# Patient Record
Sex: FEMALE | URBAN - METROPOLITAN AREA
[De-identification: names, ages, dates, MRNs, and addresses within clinical notes are randomized per-mention and may not be internally consistent; named-entity substitution may affect disease eponyms.]

---

## 2017-10-10 ENCOUNTER — OUTPATIENT (OUTPATIENT)
Dept: OUTPATIENT SERVICES | Facility: HOSPITAL | Age: 72
LOS: 1 days | End: 2017-10-10
Payer: MEDICARE

## 2017-10-10 VITALS
DIASTOLIC BLOOD PRESSURE: 80 MMHG | OXYGEN SATURATION: 96 % | SYSTOLIC BLOOD PRESSURE: 123 MMHG | TEMPERATURE: 98 F | RESPIRATION RATE: 18 BRPM | WEIGHT: 145.06 LBS | HEIGHT: 63 IN | HEART RATE: 93 BPM

## 2017-10-10 DIAGNOSIS — E10.9 TYPE 1 DIABETES MELLITUS WITHOUT COMPLICATIONS: ICD-10-CM

## 2017-10-10 DIAGNOSIS — Z01.818 ENCOUNTER FOR OTHER PREPROCEDURAL EXAMINATION: ICD-10-CM

## 2017-10-10 DIAGNOSIS — N20.0 CALCULUS OF KIDNEY: ICD-10-CM

## 2017-10-10 DIAGNOSIS — Z98.890 OTHER SPECIFIED POSTPROCEDURAL STATES: Chronic | ICD-10-CM

## 2017-10-10 DIAGNOSIS — Z98.89 OTHER SPECIFIED POSTPROCEDURAL STATES: Chronic | ICD-10-CM

## 2017-10-10 LAB
ANION GAP SERPL CALC-SCNC: 18 MMOL/L — HIGH (ref 5–17)
BUN SERPL-MCNC: 17 MG/DL — SIGNIFICANT CHANGE UP (ref 7–23)
CALCIUM SERPL-MCNC: 9.9 MG/DL — SIGNIFICANT CHANGE UP (ref 8.4–10.5)
CHLORIDE SERPL-SCNC: 99 MMOL/L — SIGNIFICANT CHANGE UP (ref 96–108)
CO2 SERPL-SCNC: 22 MMOL/L — SIGNIFICANT CHANGE UP (ref 22–31)
CREAT SERPL-MCNC: 0.76 MG/DL — SIGNIFICANT CHANGE UP (ref 0.5–1.3)
GLUCOSE SERPL-MCNC: 127 MG/DL — HIGH (ref 70–99)
HBA1C BLD-MCNC: 6.8 % — HIGH (ref 4–5.6)
HCT VFR BLD CALC: 40.7 % — SIGNIFICANT CHANGE UP (ref 34.5–45)
HGB BLD-MCNC: 13.3 G/DL — SIGNIFICANT CHANGE UP (ref 11.5–15.5)
MCHC RBC-ENTMCNC: 28.1 PG — SIGNIFICANT CHANGE UP (ref 27–34)
MCHC RBC-ENTMCNC: 32.7 GM/DL — SIGNIFICANT CHANGE UP (ref 32–36)
MCV RBC AUTO: 86 FL — SIGNIFICANT CHANGE UP (ref 80–100)
PLATELET # BLD AUTO: 322 K/UL — SIGNIFICANT CHANGE UP (ref 150–400)
POTASSIUM SERPL-MCNC: 4.1 MMOL/L — SIGNIFICANT CHANGE UP (ref 3.5–5.3)
POTASSIUM SERPL-SCNC: 4.1 MMOL/L — SIGNIFICANT CHANGE UP (ref 3.5–5.3)
RBC # BLD: 4.73 M/UL — SIGNIFICANT CHANGE UP (ref 3.8–5.2)
RBC # FLD: 14 % — SIGNIFICANT CHANGE UP (ref 10.3–14.5)
SODIUM SERPL-SCNC: 139 MMOL/L — SIGNIFICANT CHANGE UP (ref 135–145)
WBC # BLD: 5.78 K/UL — SIGNIFICANT CHANGE UP (ref 3.8–10.5)
WBC # FLD AUTO: 5.78 K/UL — SIGNIFICANT CHANGE UP (ref 3.8–10.5)

## 2017-10-10 NOTE — H&P PST ADULT - NSANTHOSAYNRD_GEN_A_CORE
No. DENIS screening performed.  STOP BANG Legend: 0-2 = LOW Risk; 3-4 = INTERMEDIATE Risk; 5-8 = HIGH Risk

## 2017-10-10 NOTE — H&P PST ADULT - HISTORY OF PRESENT ILLNESS
72 y/o F PMH renal calculi, S/P lithotripsy, now with recurrent left renal calculi on imaging, denies hematuria, flank pain, fever or dysuria.  Presents today for left ESWL.

## 2017-10-10 NOTE — H&P PST ADULT - ABILITY TO HEAR (WITH HEARING AID OR HEARING APPLIANCE IF NORMALLY USED):
Mildly to Moderately Impaired: difficulty hearing in some environments or speaker may need to increase volume or speak distinctly/no hearing devices

## 2017-10-10 NOTE — H&P PST ADULT - PMH
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

## 2017-10-11 LAB
CULTURE RESULTS: NO GROWTH — SIGNIFICANT CHANGE UP
SPECIMEN SOURCE: SIGNIFICANT CHANGE UP

## 2017-10-25 ENCOUNTER — TRANSCRIPTION ENCOUNTER (OUTPATIENT)
Age: 72
End: 2017-10-25

## 2017-10-26 ENCOUNTER — OUTPATIENT (OUTPATIENT)
Dept: OUTPATIENT SERVICES | Facility: HOSPITAL | Age: 72
LOS: 1 days | End: 2017-10-26
Payer: MEDICARE

## 2017-10-26 VITALS
SYSTOLIC BLOOD PRESSURE: 133 MMHG | HEIGHT: 63 IN | DIASTOLIC BLOOD PRESSURE: 88 MMHG | WEIGHT: 143.08 LBS | TEMPERATURE: 98 F | RESPIRATION RATE: 18 BRPM | OXYGEN SATURATION: 95 % | HEART RATE: 76 BPM

## 2017-10-26 VITALS
TEMPERATURE: 98 F | DIASTOLIC BLOOD PRESSURE: 70 MMHG | RESPIRATION RATE: 18 BRPM | SYSTOLIC BLOOD PRESSURE: 130 MMHG | OXYGEN SATURATION: 100 % | HEART RATE: 88 BPM

## 2017-10-26 DIAGNOSIS — N20.0 CALCULUS OF KIDNEY: ICD-10-CM

## 2017-10-26 DIAGNOSIS — Z98.890 OTHER SPECIFIED POSTPROCEDURAL STATES: Chronic | ICD-10-CM

## 2017-10-26 DIAGNOSIS — Z98.89 OTHER SPECIFIED POSTPROCEDURAL STATES: Chronic | ICD-10-CM

## 2017-10-26 LAB — GLUCOSE BLDC GLUCOMTR-MCNC: 124 MG/DL — HIGH (ref 70–99)

## 2017-10-26 PROCEDURE — 85027 COMPLETE CBC AUTOMATED: CPT

## 2017-10-26 PROCEDURE — 76000 FLUOROSCOPY <1 HR PHYS/QHP: CPT

## 2017-10-26 PROCEDURE — 87086 URINE CULTURE/COLONY COUNT: CPT

## 2017-10-26 PROCEDURE — 80048 BASIC METABOLIC PNL TOTAL CA: CPT

## 2017-10-26 PROCEDURE — 50590 FRAGMENTING OF KIDNEY STONE: CPT | Mod: LT

## 2017-10-26 PROCEDURE — 83036 HEMOGLOBIN GLYCOSYLATED A1C: CPT

## 2017-10-26 PROCEDURE — G0463: CPT

## 2017-10-26 PROCEDURE — 82962 GLUCOSE BLOOD TEST: CPT

## 2017-10-26 PROCEDURE — 74018 RADEX ABDOMEN 1 VIEW: CPT

## 2017-10-26 PROCEDURE — 74000: CPT | Mod: 26

## 2017-10-26 RX ORDER — LANOLIN ALCOHOL/MO/W.PET/CERES
1 CREAM (GRAM) TOPICAL
Qty: 0 | Refills: 0 | COMMUNITY

## 2017-10-26 RX ORDER — LIDOCAINE HCL 20 MG/ML
0.2 VIAL (ML) INJECTION ONCE
Qty: 0 | Refills: 0 | Status: DISCONTINUED | OUTPATIENT
Start: 2017-10-26 | End: 2017-10-26

## 2017-10-26 RX ORDER — ATORVASTATIN CALCIUM 80 MG/1
1 TABLET, FILM COATED ORAL
Qty: 0 | Refills: 0 | COMMUNITY

## 2017-10-26 RX ORDER — OMEPRAZOLE 10 MG/1
1 CAPSULE, DELAYED RELEASE ORAL
Qty: 0 | Refills: 0 | COMMUNITY

## 2017-10-26 RX ORDER — AMLODIPINE BESYLATE 2.5 MG/1
1 TABLET ORAL
Qty: 0 | Refills: 0 | COMMUNITY

## 2017-10-26 RX ORDER — SODIUM CHLORIDE 9 MG/ML
1000 INJECTION, SOLUTION INTRAVENOUS
Qty: 0 | Refills: 0 | Status: DISCONTINUED | OUTPATIENT
Start: 2017-10-26 | End: 2017-11-10

## 2017-10-26 RX ORDER — SODIUM CHLORIDE 9 MG/ML
3 INJECTION INTRAMUSCULAR; INTRAVENOUS; SUBCUTANEOUS EVERY 8 HOURS
Qty: 0 | Refills: 0 | Status: DISCONTINUED | OUTPATIENT
Start: 2017-10-26 | End: 2017-10-26

## 2017-10-26 RX ORDER — METFORMIN HYDROCHLORIDE 850 MG/1
4 TABLET ORAL
Qty: 0 | Refills: 0 | COMMUNITY

## 2017-10-26 RX ORDER — ASPIRIN/CALCIUM CARB/MAGNESIUM 324 MG
1 TABLET ORAL
Qty: 0 | Refills: 0 | COMMUNITY

## 2017-10-26 NOTE — BRIEF OPERATIVE NOTE - PROCEDURE
<<-----Click on this checkbox to enter Procedure ESWL, kidney, left  10/26/2017    [facility]

## 2017-10-26 NOTE — ASU DISCHARGE PLAN (ADULT/PEDIATRIC). - NOTIFY
Persistent Nausea and Vomiting/Unable to Urinate/Bleeding that does not stop/Inability to Tolerate Liquids or Foods/Pain not relieved by Medications/Fever greater than 101

## 2017-10-26 NOTE — ASU DISCHARGE PLAN (ADULT/PEDIATRIC). - MEDICATION SUMMARY - MEDICATIONS TO TAKE
I will START or STAY ON the medications listed below when I get home from the hospital:    Percocet 5/325 oral tablet  -- 1 tab(s) by mouth every 6 hours MDD:4  -- Caution federal law prohibits the transfer of this drug to any person other  than the person for whom it was prescribed.  May cause drowsiness.  Alcohol may intensify this effect.  Use care when operating dangerous machinery.  This prescription cannot be refilled.  This product contains acetaminophen.  Do not use  with any other product containing acetaminophen to prevent possible liver damage.  Using more of this medication than prescribed may cause serious breathing problems.    -- Indication: For pain medication     aspirin 81 mg oral tablet  -- 1 tab(s) by mouth once a day  -- Indication: For home medication     metFORMIN 500 mg oral tablet  -- 4 tab(s) by mouth once a day  -- Indication: For home medication     atorvastatin 10 mg oral tablet  -- 1 tab(s) by mouth once a day  -- Indication: For home medication     amLODIPine 10 mg oral tablet  -- 1 tab(s) by mouth once a day  -- Indication: For home medication     Melatonin 5 mg oral tablet  -- 1 tab(s) by mouth once a day (at bedtime)  -- Indication: For home medication    omeprazole 20 mg oral delayed release capsule  -- 1 cap(s) by mouth once a day  -- Indication: For home medication     multivitamin  --  1 tab daily stopped 11/26/14   -- Indication: For home medication     Vitamin D3 1000 intl units oral capsule  -- 1 cap(s) by mouth once a day  -- Indication: For home medication

## 2018-06-11 ENCOUNTER — APPOINTMENT (OUTPATIENT)
Dept: NUCLEAR MEDICINE | Facility: HOSPITAL | Age: 73
End: 2018-06-11

## 2018-06-11 PROBLEM — Z00.00 ENCOUNTER FOR PREVENTIVE HEALTH EXAMINATION: Status: ACTIVE | Noted: 2018-06-11

## 2018-06-27 ENCOUNTER — APPOINTMENT (OUTPATIENT)
Dept: NUCLEAR MEDICINE | Facility: HOSPITAL | Age: 73
End: 2018-06-27
Payer: MEDICARE

## 2018-06-27 ENCOUNTER — APPOINTMENT (OUTPATIENT)
Dept: NUCLEAR MEDICINE | Facility: HOSPITAL | Age: 73
End: 2018-06-27

## 2018-06-27 ENCOUNTER — OUTPATIENT (OUTPATIENT)
Dept: OUTPATIENT SERVICES | Facility: HOSPITAL | Age: 73
LOS: 1 days | End: 2018-06-27

## 2018-06-27 DIAGNOSIS — Z98.890 OTHER SPECIFIED POSTPROCEDURAL STATES: Chronic | ICD-10-CM

## 2018-06-27 DIAGNOSIS — G20 PARKINSON'S DISEASE: ICD-10-CM

## 2018-06-27 DIAGNOSIS — Z98.89 OTHER SPECIFIED POSTPROCEDURAL STATES: Chronic | ICD-10-CM

## 2018-06-27 PROCEDURE — 78607: CPT | Mod: 26

## 2019-12-23 NOTE — BRIEF OPERATIVE NOTE - PROCEDURE POST
Vaccine Information Statement(s) was given today. This has been reviewed, questions answered, and verbal consent given by Patient for injection(s) and administration of Influenza (Inactivated).    1. Does the patient have a moderate to severe fever?  No  2. Has the patient had a serious reaction to a flu shot before?   No  3. Has the patient ever had Guillian Monett Syndrome within 6 weeks of a previous flu shot?  No  4. Is the patient less than 6 months of age?  No    Patient is eligible to receive the vaccine based on all questions being answered as 'No'.    Patient tolerated without incident. See immunization grid for documentation.         <<-----Click on this checkbox to enter Post-Op Dx

## 2020-05-08 NOTE — H&P PST ADULT - ENMT
Itchy ears consistent with eczema of the skin which is worse during allergy season.  Advised to use very small amounts of over-the-counter hydrocortisone as needed to reduce the itching.   negative No oral lesions; no gross abnormalities

## 2020-10-09 PROBLEM — E11.9 TYPE 2 DIABETES MELLITUS WITHOUT COMPLICATIONS: Chronic | Status: ACTIVE | Noted: 2017-10-10

## 2020-10-09 PROBLEM — I10 ESSENTIAL (PRIMARY) HYPERTENSION: Chronic | Status: ACTIVE | Noted: 2017-10-10

## 2020-11-03 ENCOUNTER — NON-APPOINTMENT (OUTPATIENT)
Age: 75
End: 2020-11-03

## 2020-11-03 DIAGNOSIS — E78.5 HYPERLIPIDEMIA, UNSPECIFIED: ICD-10-CM

## 2020-11-03 DIAGNOSIS — Z86.39 PERSONAL HISTORY OF OTHER ENDOCRINE, NUTRITIONAL AND METABOLIC DISEASE: ICD-10-CM

## 2020-11-03 DIAGNOSIS — Z87.442 PERSONAL HISTORY OF URINARY CALCULI: ICD-10-CM

## 2020-11-03 DIAGNOSIS — I10 ESSENTIAL (PRIMARY) HYPERTENSION: ICD-10-CM

## 2020-11-03 DIAGNOSIS — Z83.3 FAMILY HISTORY OF DIABETES MELLITUS: ICD-10-CM

## 2020-11-03 DIAGNOSIS — Z87.898 PERSONAL HISTORY OF OTHER SPECIFIED CONDITIONS: ICD-10-CM

## 2020-11-03 RX ORDER — ATORVASTATIN CALCIUM 80 MG/1
TABLET, FILM COATED ORAL
Refills: 0 | Status: ACTIVE | COMMUNITY

## 2020-11-03 RX ORDER — CARBIDOPA AND LEVODOPA 25; 100 MG/1; MG/1
25-100 TABLET, EXTENDED RELEASE ORAL
Refills: 0 | Status: ACTIVE | COMMUNITY

## 2020-11-03 RX ORDER — ASPIRIN 81 MG
TABLET,CHEWABLE ORAL
Refills: 0 | Status: ACTIVE | COMMUNITY

## 2020-11-03 RX ORDER — MULTIVITAMIN
TABLET ORAL
Refills: 0 | Status: ACTIVE | COMMUNITY

## 2020-11-03 RX ORDER — CHOLECALCIFEROL (VITAMIN D3) 25 MCG
TABLET ORAL
Refills: 0 | Status: ACTIVE | COMMUNITY

## 2020-11-03 RX ORDER — METFORMIN HYDROCHLORIDE 625 MG/1
TABLET ORAL
Refills: 0 | Status: ACTIVE | COMMUNITY

## 2020-11-03 RX ORDER — PRAMIPEXOLE DIHYDROCHLORIDE 0.25 MG/1
0.25 TABLET ORAL
Refills: 0 | Status: ACTIVE | COMMUNITY

## 2021-01-12 ENCOUNTER — APPOINTMENT (OUTPATIENT)
Dept: NEUROLOGY | Facility: CLINIC | Age: 76
End: 2021-01-12
Payer: MEDICARE

## 2021-01-12 VITALS — SYSTOLIC BLOOD PRESSURE: 134 MMHG | HEART RATE: 76 BPM | DIASTOLIC BLOOD PRESSURE: 67 MMHG

## 2021-01-12 VITALS — TEMPERATURE: 97.2 F

## 2021-01-12 PROCEDURE — 99214 OFFICE O/P EST MOD 30 MIN: CPT

## 2021-01-12 RX ORDER — METOPROLOL SUCCINATE 25 MG/1
25 TABLET, EXTENDED RELEASE ORAL
Qty: 90 | Refills: 0 | Status: ACTIVE | COMMUNITY
Start: 2020-07-08

## 2021-01-12 RX ORDER — FAMOTIDINE 20 MG/1
20 TABLET, FILM COATED ORAL
Qty: 180 | Refills: 0 | Status: ACTIVE | COMMUNITY
Start: 2020-10-15

## 2021-01-12 RX ORDER — CARBIDOPA AND LEVODOPA 25; 100 MG/1; MG/1
25-100 TABLET ORAL
Qty: 540 | Refills: 0 | Status: ACTIVE | COMMUNITY
Start: 2020-08-18

## 2021-01-12 RX ORDER — AMLODIPINE BESYLATE 10 MG/1
10 TABLET ORAL
Qty: 90 | Refills: 0 | Status: ACTIVE | COMMUNITY
Start: 2020-07-13

## 2021-01-12 RX ORDER — OMEPRAZOLE 20 MG/1
TABLET, DELAYED RELEASE ORAL
Refills: 0 | Status: DISCONTINUED | COMMUNITY
End: 2021-01-12

## 2021-01-12 RX ORDER — PRAMIPEXOLE DIHYDROCHLORIDE 0.12 MG/1
0.12 TABLET ORAL
Qty: 540 | Refills: 0 | Status: ACTIVE | COMMUNITY
Start: 2020-08-18

## 2021-01-12 RX ORDER — METFORMIN ER 500 MG 500 MG/1
500 TABLET ORAL
Qty: 270 | Refills: 0 | Status: ACTIVE | COMMUNITY
Start: 2020-03-13

## 2021-01-12 NOTE — CONSULT LETTER
[Dear  ___] : Dear  [unfilled], [Consult Letter:] : I had the pleasure of evaluating your patient, [unfilled]. [Please see my note below.] : Please see my note below. [Consult Closing:] : Thank you very much for allowing me to participate in the care of this patient.  If you have any questions, please do not hesitate to contact me. [Sincerely,] : Sincerely, [FreeTextEntry3] : Pawan Cheng MD\par

## 2021-01-12 NOTE — PHYSICAL EXAM
[FreeTextEntry1] : Constitutional:  Patient was well-developed, well-nourished and in no acute distress. \par \par Head:  Normocephalic, atraumatic. Tympanic membranes were clear. \par \par Neck:  Supple with full range of motion. \par \par Cardiovascular:  Cardiac rhythm was regular without murmur. There were no carotid bruits. Peripheral pulses were full and symmetric. \par \par Respiratory:  Lungs were clear. \par \par Abdomen:  Soft and nontender. \par \par Spine:  Nontender. \par \par Skin:  There were no rashes. \par \par NEUROLOGICAL EXAMINATION:\par \par Mental Status: Patient was alert and oriented. Speech was fluent. There was no dysarthria.  The was only mildly hypophonic.\par \par Cranial Nerves: \par \par II: She could finger count bilaterally. Pupils were equal and reactive. Visual fields were full. Funduscopic examination was normal. \par \par III, IV, VI:  Eye movements were full without nystagmus. \par \par V: Facial sensation was intact. \par \par VII: Facial strength was normal.  There was mild hypomimia.\par \par VIII: Hearing was equal. \par \par IX, X: Palatal movement was normal. Phonation was normal. \par \par XI: Sternocleidomastoids and trapezii were normal. \par \par XII: Tongue was midline and movements normal. There was no lingual atrophy or fasciculations. \par \par Motor Examination: Muscle bulk, tone and strength were normal.  Fine finger and rapid alternating movements were mildly slow.  There was no tremor.\par \par Sensory Examination: Pinprick, vibration and joint position sense were intact. \par \par Reflexes: DTRs were 2+ throughout. \par \par Plantar Responses: Plantar responses were flexor. \par \par Coordination/Cerebellar Function: There was no dysmetria on finger to nose or heel to shin testing. \par \par Gait/Stance: Gait was mildly stiff but stable. Romberg was negative.  Tandem was mildly unsteady.\par

## 2021-01-12 NOTE — ASSESSMENT
[FreeTextEntry1] : Mrs. Gutierrez is a 75-year-old with mild idiopathic Parkinson's disease and restless leg syndrome. She is doing well from both cognitive and motor perspectives.  Her current Sinemet and pramipexole regimen will be continued unchanged. I suggested a routine follow-up visit in June 2021. Further management will depend upon her clinical course.

## 2021-01-12 NOTE — HISTORY OF PRESENT ILLNESS
[FreeTextEntry1] : Mrs. Amaris Gutierrez was last evaluated on August 18, 2020 via telemedicine. She is a 75-year-old right-handed patient with idiopathic Parkinson’s disease, restless leg syndrome, and periodic limb movements of sleep. She remains on Sinemet 25/100 mg one-and-half tablet three times a day and pramipexole 0.25 mg at bedtime.\par \par She is doing very well. She is ambulating independently.  She has occasional hand tremor.  She denies cognitive or bulbar difficulties. She has no leg problems when she reposes but they occur at about 6:00 in the morning.  She describes leg jumping.\par

## 2021-07-12 ENCOUNTER — APPOINTMENT (OUTPATIENT)
Dept: NEUROLOGY | Facility: CLINIC | Age: 76
End: 2021-07-12
Payer: MEDICARE

## 2021-07-12 VITALS
HEIGHT: 63 IN | WEIGHT: 148 LBS | SYSTOLIC BLOOD PRESSURE: 126 MMHG | BODY MASS INDEX: 26.22 KG/M2 | DIASTOLIC BLOOD PRESSURE: 74 MMHG | HEART RATE: 76 BPM

## 2021-07-12 PROCEDURE — 99213 OFFICE O/P EST LOW 20 MIN: CPT

## 2021-07-12 NOTE — ASSESSMENT
[FreeTextEntry1] : Mrs. Gutierrez is a 75-year-old with mild idiopathic Parkinson's disease and restless leg syndrome.  She seems to be doing very well from a motor perspective.  Her major complaints she attributes to sleep disturbance.  She has been evaluated in the past by Dr. Manav Noonan, a pulmonologist.  I suggested reevaluation.  If desired, she may increase her Sinemet 25/100 dose to up to 2 tablets 4 times a day.  She will continue pramipexole 0.25 mg at bedtime.  Her low back pain is chronic and tolerable and she declines physical therapy or updated imaging.  She will return to the office in 5 to 6 months for routine follow-up.  Telephone contact will be maintained in the meantime.

## 2021-07-12 NOTE — PHYSICAL EXAM
[FreeTextEntry1] : Constitutional:  Patient was well-developed, well-nourished and in no acute distress. \par \par Head:  Normocephalic, atraumatic. Tympanic membranes were clear. \par \par Neck:  Supple with full range of motion. \par \par Cardiovascular:  Cardiac rhythm was regular without murmur. There were no carotid bruits. Peripheral pulses were full and symmetric. \par \par Respiratory:  Lungs were clear. \par \par Abdomen:  Soft and nontender. \par \par Spine:  Nontender. \par \par Skin:  There were no rashes. \par \par NEUROLOGICAL EXAMINATION:\par \par Mental Status: Patient was alert and oriented. Speech was fluent. There was no dysarthria.  The was only mildly hypophonic.\par \par Cranial Nerves: \par \par II: She could finger count bilaterally. Pupils were equal and reactive. Visual fields were full. Funduscopic examination was normal. \par \par III, IV, VI:  Eye movements were full without nystagmus. \par \par V: Facial sensation was intact. \par \par VII: Facial strength was normal.  There was mild hypomimia.\par \par VIII: Hearing was equal. \par \par IX, X: Palatal movement was normal. Phonation was normal. \par \par XI: Sternocleidomastoids and trapezii were normal. \par \par XII: Tongue was midline and movements normal. There was no lingual atrophy or fasciculations. \par \par Motor Examination: Muscle bulk, tone and strength were normal.  Fine finger and rapid alternating movements were mildly slow, left more than right.  There was no tremor.\par \par Sensory Examination: Pinprick, vibration and joint position sense were intact. \par \par Reflexes: DTRs were 2+ throughout. \par \par Plantar Responses: Plantar responses were flexor. \par \par Coordination/Cerebellar Function: There was no dysmetria on finger to nose or heel to shin testing. \par \par Gait/Stance: Gait was mildly stiff but stable. Romberg was negative.  Tandem was mildly unsteady.\par

## 2021-07-12 NOTE — CONSULT LETTER
[Dear  ___] : Dear  [unfilled], [Consult Letter:] : I had the pleasure of evaluating your patient, [unfilled]. [Please see my note below.] : Please see my note below. [Consult Closing:] : Thank you very much for allowing me to participate in the care of this patient.  If you have any questions, please do not hesitate to contact me. [Sincerely,] : Sincerely, [FreeTextEntry3] : Pawan Cheng MD\par  [DrAleksandra  ___] : Dr. CORONADO

## 2021-07-12 NOTE — HISTORY OF PRESENT ILLNESS
[FreeTextEntry1] : Mrs. Amaris Gutierrez returned to the office having been last seen on January 12, 2021. She is a 75-year-old right-handed patient with idiopathic Parkinson’s disease, restless leg syndrome, and periodic limb movements of sleep.  She remains on Sinemet 25/100 1-1/2 tablets 3 times a day.  She added a dose at 1:30 in the morning because of difficulties maintaining sleep.  This seems to help.  She takes pramipexole 0.25 mg at bedtime.\par \par She denies any difficulty ambulating.  She has mild difficulty getting out of her chair sometimes falling backwards.  She has occasional action tremors.  She has chronic low back pain when ambulating.  She has difficulty maintaining sleep.  She denies cognitive difficulties.

## 2021-10-05 ENCOUNTER — RX RENEWAL (OUTPATIENT)
Age: 76
End: 2021-10-05

## 2021-12-17 ENCOUNTER — APPOINTMENT (OUTPATIENT)
Dept: NEUROLOGY | Facility: CLINIC | Age: 76
End: 2021-12-17
Payer: MEDICARE

## 2021-12-17 VITALS
BODY MASS INDEX: 26.22 KG/M2 | SYSTOLIC BLOOD PRESSURE: 152 MMHG | DIASTOLIC BLOOD PRESSURE: 87 MMHG | HEIGHT: 63 IN | WEIGHT: 148 LBS | HEART RATE: 84 BPM

## 2021-12-17 PROCEDURE — 99214 OFFICE O/P EST MOD 30 MIN: CPT

## 2021-12-17 RX ORDER — PRAMIPEXOLE DIHYDROCHLORIDE 0.12 MG/1
0.12 TABLET ORAL AT BEDTIME
Qty: 90 | Refills: 2 | Status: ACTIVE | COMMUNITY
Start: 2021-12-17 | End: 1900-01-01

## 2021-12-17 NOTE — ASSESSMENT
[FreeTextEntry1] : Mrs. Gutierrez is a 76-year-old with mild idiopathic Parkinson's disease and restless leg syndrome.  Her RLS seems to be her major concern.  I suggested increasing her ropinirole dose to 0.625 mg at bedtime.  She will monitor for behavioral problems.  If she wishes, she may adjust her Sinemet dose upward a bit.  Increasing her dose to 2 tablets 4 times a day would be an option.  She may increase or decrease the Sinemet dose as dictated by her symptoms and level of motor function.  She will return to the office in 4 months for routine follow-up.  Telephone contact will be made in the meantime.

## 2021-12-17 NOTE — HISTORY OF PRESENT ILLNESS
[FreeTextEntry1] : Mrs. Amaris Gutierrez returned to the office having been last seen on July 12, 2021. She is a 76-year-old right-handed patient with idiopathic Parkinson’s disease, restless leg syndrome, and periodic limb movements of sleep.  \par \par She has several concerns.  The primary one is restless leg syndrome.  She is taking pramipexole 1.25 mg 2 tablets at bedtime.  She continues to experience kicking of her legs when she gets into bed.  This ceases when she falls asleep.  She denies any compulsive behavioral difficulties.\par \par She is very stable on her feet.  She describes occasional festination.  She is now on Sinemet 25/101 and half tablets at 8:30 in the morning, 2:30 in the afternoon, 7:30 in the evening and 2 tablets at 12:30 AM.\par \par Her niece gave her Chinese herbal medicine.  She believes that this might be helping her restless leg syndrome.

## 2021-12-17 NOTE — CONSULT LETTER
[Dear  ___] : Dear  [unfilled], [Consult Letter:] : I had the pleasure of evaluating your patient, [unfilled]. [Please see my note below.] : Please see my note below. [Consult Closing:] : Thank you very much for allowing me to participate in the care of this patient.  If you have any questions, please do not hesitate to contact me. [Sincerely,] : Sincerely, [DrAleksandra  ___] : Dr. CORONADO [FreeTextEntry3] : Pawan Cheng MD\par

## 2022-04-08 ENCOUNTER — APPOINTMENT (OUTPATIENT)
Dept: NEUROLOGY | Facility: CLINIC | Age: 77
End: 2022-04-08
Payer: MEDICARE

## 2022-04-08 VITALS
HEIGHT: 63 IN | DIASTOLIC BLOOD PRESSURE: 73 MMHG | WEIGHT: 146 LBS | HEART RATE: 80 BPM | SYSTOLIC BLOOD PRESSURE: 125 MMHG | BODY MASS INDEX: 25.87 KG/M2

## 2022-04-08 PROCEDURE — 99213 OFFICE O/P EST LOW 20 MIN: CPT

## 2022-04-08 NOTE — HISTORY OF PRESENT ILLNESS
[FreeTextEntry1] : Mrs. Amaris Gutierrez returned to the office having been last seen on December 17, 2021. She is a 76-year-old right-handed patient with idiopathic Parkinson’s disease, restless leg syndrome, and periodic limb movements of sleep.  \par \par She is cognitively well.  She complains of gait festination.  She is still having restless leg symptoms when she reposes in bed at at 1030 or 11:00 at night.  This lasts about half an hour and resolves.  She is on Sinemet 25/100 1 and half tablets 4 times a day and pramipexole 0.125 mg 3 tablets at bedtime.

## 2022-04-08 NOTE — PHYSICAL EXAM
[FreeTextEntry1] : Constitutional:  Patient was well-developed, well-nourished and in no acute distress. \par \par Head:  Normocephalic, atraumatic. Tympanic membranes were clear. \par \par Neck:  Supple with full range of motion. \par \par Cardiovascular:  Cardiac rhythm was regular without murmur. There were no carotid bruits. Peripheral pulses were full and symmetric. \par \par Respiratory:  Lungs were clear. \par \par Abdomen:  Soft and nontender. \par \par Spine:  Nontender. \par \par Skin:  There were no rashes. \par \par NEUROLOGICAL EXAMINATION:\par \par Mental Status: Patient was alert and oriented. Speech was fluent. There was no dysarthria.  The was only mildly hypophonic.  She scored 29 out of 30 on MMSE making 1 error in recall.  She handwriting was not micrographic.\par \par Cranial Nerves: \par \par II: She could finger count bilaterally.  The right pupil was surgical and both were reactive. Visual fields were full. Funduscopic examination was normal. \par \par III, IV, VI:  Eye movements were full without nystagmus. \par \par V: Facial sensation was intact. \par \par VII: Facial strength was normal.  There was mild hypomimia.\par \par VIII: Hearing was equal. \par \par IX, X: Palatal movement was normal. Phonation was normal. \par \par XI: Sternocleidomastoids and trapezii were normal. \par \par XII: Tongue was midline and movements normal. There was no lingual atrophy or fasciculations. \par \par Motor Examination: Muscle bulk, tone and strength were normal.  Fine finger and rapid alternating movements were mildly slow, left more than right.  There was no tremor.\par \par Sensory Examination: Pinprick, vibration and joint position sense were intact. \par \par Reflexes: DTRs were 2+ throughout. \par \par Plantar Responses: Plantar responses were flexor. \par \par Coordination/Cerebellar Function: There was no dysmetria on finger to nose or heel to shin testing. \par \par Gait/Stance: Gait was mildly stiff but stable.  Strides were short and turns decomposed.  Romberg was negative.  Tandem was mildly unsteady.\par

## 2022-08-15 ENCOUNTER — RX RENEWAL (OUTPATIENT)
Age: 77
End: 2022-08-15

## 2022-08-15 RX ORDER — CARBIDOPA AND LEVODOPA 25; 100 MG/1; MG/1
25-100 TABLET ORAL 3 TIMES DAILY
Qty: 540 | Refills: 3 | Status: ACTIVE | COMMUNITY
Start: 2021-01-12 | End: 1900-01-01

## 2022-10-27 ENCOUNTER — APPOINTMENT (OUTPATIENT)
Dept: NEUROLOGY | Facility: CLINIC | Age: 77
End: 2022-10-27

## 2022-10-27 VITALS
BODY MASS INDEX: 24.1 KG/M2 | SYSTOLIC BLOOD PRESSURE: 129 MMHG | WEIGHT: 136 LBS | DIASTOLIC BLOOD PRESSURE: 75 MMHG | HEART RATE: 77 BPM | HEIGHT: 63 IN

## 2022-10-27 PROCEDURE — 99213 OFFICE O/P EST LOW 20 MIN: CPT

## 2022-10-27 NOTE — HISTORY OF PRESENT ILLNESS
[FreeTextEntry1] : Mrs. Amaris Gutierrez returned to the office having been last seen on April 8, 2022. She is a 77-year-old right-handed patient with idiopathic Parkinson’s disease, restless leg syndrome, and periodic limb movements of sleep.  \par \par She remains on on Sinemet 25/100 1 and half tablets 4 times a day and pramipexole 0.125 mg 3 tablets at bedtime.  She reports that her restless leg syndrome is quite severe.  She does not respond to pramipexole.  She is walking independently.  She recently suffered 3 falls on her driveway while moving boxes.  She denies tremor or cognitive difficulties.\par \par Medications include Sinemet, pramipexole, Januvia, amlodipine, atorvastatin, baby aspirin, famotidine, metformin, metoprolol and vitamin D3.

## 2022-10-27 NOTE — PHYSICAL EXAM
[FreeTextEntry1] : Constitutional:  Patient was well-developed, well-nourished and in no acute distress. \par \par Head:  Normocephalic, atraumatic. Tympanic membranes were clear. \par \par Neck:  Supple with full range of motion. \par \par Cardiovascular:  Cardiac rhythm was regular without murmur. There were no carotid bruits. Peripheral pulses were full and symmetric. \par \par Respiratory:  Lungs were clear. \par \par Abdomen:  Soft and nontender. \par \par Spine:  Nontender. \par \par Skin:  There were no rashes. \par \par NEUROLOGICAL EXAMINATION:\par \par Mental Status: Patient was alert and oriented. Speech was fluent. There was no dysarthria.  The was only mildly hypophonic.  \par \par Cranial Nerves: \par \par II: She could finger count bilaterally.  The right pupil was surgical and both were reactive. Visual fields were full. Funduscopic examination was normal. \par \par III, IV, VI:  Eye movements were full without nystagmus. \par \par V: Facial sensation was intact. \par \par VII: Facial strength was normal.  There was mild hypomimia.\par \par VIII: Hearing was equal. \par \par IX, X: Palatal movement was normal. Phonation was normal. \par \par XI: Sternocleidomastoids and trapezii were normal. \par \par XII: Tongue was midline and movements normal. There was no lingual atrophy or fasciculations. \par \par Motor Examination: Muscle bulk, tone and strength were normal.  Fine finger and rapid alternating movements were mildly slow, left more than right.  There was no tremor.\par \par Sensory Examination: Pinprick, vibration and joint position sense were intact. \par \par Reflexes: DTRs were 2+ throughout. \par \par Plantar Responses: Plantar responses were flexor. \par \par Coordination/Cerebellar Function: There was no dysmetria on finger to nose or heel to shin testing. \par \par Gait/Stance: Gait was mildly stiff but stable.  Strides were short and turns decomposed.  Romberg was negative.  Tandem was mildly unsteady.\par

## 2022-10-27 NOTE — ASSESSMENT
[FreeTextEntry1] : Mrs. Gutierrez is a 77-year-old with mild idiopathic Parkinson's disease and restless leg syndrome.  If desired, she may increase her Sinemet 25/100 dose to 2 tablets 3 times a day.  Given her feeling that pramipexole is not helping her restless leg syndrome, I suggested that it be gradually tapered and discontinued.  She will monitor her restless leg symptoms.  She may introduce pramipexole after a drug holiday.  She was previously treated with gabapentin with adverse effects.  She does not wish to pursue physical therapy.  I suggested close telephone and office follow-up.

## 2022-11-12 ENCOUNTER — RX RENEWAL (OUTPATIENT)
Age: 77
End: 2022-11-12

## 2022-11-12 RX ORDER — PRAMIPEXOLE DIHYDROCHLORIDE 0.12 MG/1
0.12 TABLET ORAL DAILY
Qty: 270 | Refills: 3 | Status: ACTIVE | COMMUNITY
Start: 2022-01-26 | End: 1900-01-01

## 2023-01-09 RX ORDER — CARBIDOPA AND LEVODOPA 25; 100 MG/1; MG/1
25-100 TABLET ORAL EVERY 6 HOURS
Qty: 720 | Refills: 3 | Status: ACTIVE | COMMUNITY
Start: 2023-01-09 | End: 1900-01-01

## 2023-01-09 RX ORDER — PRAMIPEXOLE DIHYDROCHLORIDE 0.12 MG/1
0.12 TABLET ORAL
Qty: 270 | Refills: 3 | Status: ACTIVE | COMMUNITY
Start: 2023-01-09 | End: 1900-01-01

## 2023-07-17 ENCOUNTER — APPOINTMENT (OUTPATIENT)
Dept: NEUROLOGY | Facility: CLINIC | Age: 78
End: 2023-07-17
Payer: MEDICARE

## 2023-07-17 VITALS
HEART RATE: 80 BPM | SYSTOLIC BLOOD PRESSURE: 113 MMHG | BODY MASS INDEX: 26.05 KG/M2 | DIASTOLIC BLOOD PRESSURE: 76 MMHG | WEIGHT: 147 LBS | HEIGHT: 63 IN

## 2023-07-17 PROCEDURE — 99213 OFFICE O/P EST LOW 20 MIN: CPT

## 2023-07-17 NOTE — HISTORY OF PRESENT ILLNESS
[FreeTextEntry1] : Mrs. Amaris Gutierrez returned to the office having been last seen on October 27, 2022. She is a 77-year-old right-handed patient with idiopathic Parkinson’s disease, restless leg syndrome, and periodic limb movements of sleep.  \par \par She remains on on Sinemet 25/100 2 tablets 4 times a day and pramipexole 0.125 mg 3 tablets at bedtime.  She feels that she is going "downhill."  Despite this complaint, she continues to ambulate independently.  She has not fallen.  She denies cognitive difficulties.  She is not undergoing physical therapy.  At times she feels as though she is either retropulsion or falling forward.\par \par Medications include Sinemet, pramipexole, Januvia, amlodipine, atorvastatin, baby aspirin, famotidine, metformin, metoprolol and vitamin D3.

## 2023-07-17 NOTE — PHYSICAL EXAM
[FreeTextEntry1] : Constitutional:  Patient was well-developed, well-nourished and in no acute distress. \par \par Head:  Normocephalic, atraumatic. Tympanic membranes were examined. \par \par Neck:  Supple with full range of motion. \par \par Cardiovascular:  Cardiac rhythm was regular without murmur. There were no carotid bruits. Peripheral pulses were full and symmetric. \par \par Respiratory:  Lungs were clear. \par \par Abdomen:  Soft and nontender. \par \par Spine:  Nontender. \par \par Skin:  There were no rashes. \par \par NEUROLOGICAL EXAMINATION:\par \par Mental Status: Patient was alert and oriented. Speech was fluent. There was no dysarthria.  She was not hypophonic.\par \par Cranial Nerves: \par \par II: She could finger count bilaterally.  Pupils were surgical but reactive. Visual fields were full. Funduscopic examination was normal. \par \par III, IV, VI:  Eye movements were full without nystagmus. \par \par V: Facial sensation was intact. \par \par VII: Facial strength was normal.  There was mild hypomimia.\par \par VIII: Hearing was equal. \par \par IX, X: Palatal movement was normal. Phonation was normal. \par \par XI: Sternocleidomastoids and trapezii were normal. \par \par XII: Tongue was midline and movements normal. There was no lingual atrophy or fasciculations. \par \par Motor Examination: Muscle bulk, tone and strength were normal.  Fine finger and rapid alternating movements were only mildly slow.  There was no tremor.\par \par Sensory Examination: Pinprick, vibration and joint position sense were intact. \par \par Reflexes: DTRs were 2+ throughout. \par \par Plantar Responses: Plantar responses were flexor. \par \par Coordination/Cerebellar Function: There was no dysmetria on finger to nose or heel to shin testing. \par \par Gait/Stance: Gait was mildly stiff but stable.  Strides were short and turns decomposed.  Romberg was negative.  Tandem was mildly unsteady.\par

## 2023-07-17 NOTE — ASSESSMENT
[FreeTextEntry1] : Mrs. Gutierrez is a 77-year-old with mild idiopathic Parkinson's disease and restless leg syndrome.  She seems to be doing very well on her current Sinemet and pramipexole regimens.  No modifications will be made.  She does not wish to participate in physical therapy.  She will return to the office in 6 months for routine follow-up.  Telephone contact will be maintained in the meantime.

## 2023-12-04 RX ORDER — PRAMIPEXOLE DIHYDROCHLORIDE 0.12 MG/1
0.12 TABLET ORAL
Qty: 270 | Refills: 3 | Status: ACTIVE | COMMUNITY
Start: 2023-12-04 | End: 1900-01-01

## 2023-12-04 RX ORDER — CARBIDOPA AND LEVODOPA 25; 100 MG/1; MG/1
25-100 TABLET ORAL 4 TIMES DAILY
Qty: 720 | Refills: 3 | Status: ACTIVE | COMMUNITY
Start: 2023-12-04 | End: 1900-01-01

## 2024-01-31 ENCOUNTER — APPOINTMENT (OUTPATIENT)
Dept: NEUROLOGY | Facility: CLINIC | Age: 79
End: 2024-01-31
Payer: MEDICARE

## 2024-01-31 VITALS
DIASTOLIC BLOOD PRESSURE: 69 MMHG | SYSTOLIC BLOOD PRESSURE: 109 MMHG | WEIGHT: 155 LBS | BODY MASS INDEX: 27.46 KG/M2 | HEART RATE: 80 BPM | HEIGHT: 63 IN

## 2024-01-31 DIAGNOSIS — G20.A1 PARKINSON'S DISEASE WITHOUT DYSKINESIA, WITHOUT MENTION OF FLUCTUATIONS: ICD-10-CM

## 2024-01-31 DIAGNOSIS — G25.81 RESTLESS LEGS SYNDROME: ICD-10-CM

## 2024-01-31 PROCEDURE — 99213 OFFICE O/P EST LOW 20 MIN: CPT

## 2024-01-31 NOTE — HISTORY OF PRESENT ILLNESS
[FreeTextEntry1] : Mrs. Amaris Gutierrez returned to the office having been last seen on July 17, 2023. She is a 78-year-old right-handed patient with idiopathic Parkinson's disease, restless leg syndrome, and periodic limb movements of sleep.    She remains on Sinemet 25/100 2 tablets 4 times a day and pramipexole 0.125 mg 3 tablets at bedtime.    She continues to do well.  She has only mild forgetfulness.  She ambulates for the most part independently although sometimes uses a cane.  She is not currently undergoing formal physical therapy.  She denies tremor.  Past surgical history is notable for breast biopsy, lithotripsy and cataract extractions.  She suffers from hypertension, diabetes, hyperlipidemia, Parkinson's disease and restless leg syndrome.  She has no allergies.  She is .  Family history notable for diabetes and restless leg syndrome.  Medications include Sinemet, pramipexole, Januvia, amlodipine, atorvastatin, baby aspirin, famotidine, glimepiride, metoprolol, vitamin B-12 and vitamin D3.

## 2024-01-31 NOTE — PHYSICAL EXAM
[FreeTextEntry1] : Constitutional:  Patient was well-developed, well-nourished and in no acute distress.   Head:  Normocephalic, atraumatic. Tympanic membranes were not examined.  Neck:  Supple with full range of motion.   Cardiovascular:  Cardiac rhythm was regular without murmur. There were no carotid bruits. Peripheral pulses were full and symmetric.   Respiratory:  Lungs were clear.   Abdomen:  Soft and nontender.   Spine:  Nontender.   Skin:  There were no rashes.   NEUROLOGICAL EXAMINATION:  Mental Status: Patient was alert and oriented. Speech was fluent. There was no dysarthria.  She was not hypophonic.  She was fully oriented to recall 3 words probably after several minutes.  Cranial Nerves:   II: She could finger count bilaterally.  Pupils were surgical but reactive. Visual fields were full. Funduscopic examination was normal.   III, IV, VI:  Eye movements were full without nystagmus.   V: Facial sensation was intact.   VII: Facial strength was normal.  There was mild hypomimia.  VIII: Hearing was equal.   IX, X: Palatal movement was normal. Phonation was normal.   XI: Sternocleidomastoids and trapezii were normal.   XII: Tongue was midline and movements normal. There was no lingual atrophy or fasciculations.   Motor Examination: Muscle bulk, tone and strength were normal.  Fine finger and rapid alternating movements were only mildly slow.  There was no tremor.  Sensory Examination: Pinprick, vibration and joint position sense were intact.   Reflexes: DTRs were 2+ throughout.   Plantar Responses: Plantar responses were flexor.   Coordination/Cerebellar Function: There was no dysmetria on finger to nose or heel to shin testing.   Gait/Stance: Gait was mildly stiff but stable.  Strides were short and turns decomposed.  Romberg was negative.  Tandem was mildly unsteady.

## 2024-01-31 NOTE — ASSESSMENT
[FreeTextEntry1] : Mrs. Gutierrez is a 78-year-old with mild idiopathic Parkinson's disease and restless leg syndrome.  She appears cognitively well.  I provided her with a prescription for physical therapy.  She will continue Sinemet and pramipexole at the current doses.  She will return to the office in 6 months for routine follow-up.  Telephone contact will be maintained in the meantime.

## 2024-02-05 RX ORDER — CARBIDOPA AND LEVODOPA 25; 100 MG/1; MG/1
25-100 TABLET ORAL
Qty: 720 | Refills: 3 | Status: ACTIVE | COMMUNITY
Start: 2024-02-05 | End: 1900-01-01

## 2024-02-21 RX ORDER — CARBIDOPA AND LEVODOPA 25; 100 MG/1; MG/1
25-100 TABLET ORAL
Qty: 720 | Refills: 3 | Status: ACTIVE | COMMUNITY
Start: 2024-02-21 | End: 1900-01-01

## 2024-10-10 ENCOUNTER — APPOINTMENT (OUTPATIENT)
Dept: NEUROLOGY | Facility: CLINIC | Age: 79
End: 2024-10-10
Payer: MEDICARE

## 2024-10-10 VITALS
WEIGHT: 155 LBS | BODY MASS INDEX: 27.46 KG/M2 | SYSTOLIC BLOOD PRESSURE: 143 MMHG | DIASTOLIC BLOOD PRESSURE: 78 MMHG | HEIGHT: 63 IN | HEART RATE: 82 BPM

## 2024-10-10 DIAGNOSIS — G25.81 RESTLESS LEGS SYNDROME: ICD-10-CM

## 2024-10-10 PROCEDURE — 99213 OFFICE O/P EST LOW 20 MIN: CPT

## 2024-11-06 ENCOUNTER — NON-APPOINTMENT (OUTPATIENT)
Age: 79
End: 2024-11-06

## 2024-11-13 RX ORDER — CARBIDOPA AND LEVODOPA 25; 100 MG/1; MG/1
25-100 TABLET ORAL
Qty: 900 | Refills: 3 | Status: ACTIVE | COMMUNITY
Start: 2024-11-13 | End: 1900-01-01